# Patient Record
Sex: FEMALE | Race: WHITE | NOT HISPANIC OR LATINO | Employment: FULL TIME | URBAN - METROPOLITAN AREA
[De-identification: names, ages, dates, MRNs, and addresses within clinical notes are randomized per-mention and may not be internally consistent; named-entity substitution may affect disease eponyms.]

---

## 2023-02-27 ENCOUNTER — OFFICE VISIT (OUTPATIENT)
Dept: URGENT CARE | Facility: CLINIC | Age: 33
End: 2023-02-27

## 2023-02-27 ENCOUNTER — APPOINTMENT (OUTPATIENT)
Dept: RADIOLOGY | Facility: CLINIC | Age: 33
End: 2023-02-27

## 2023-02-27 VITALS
OXYGEN SATURATION: 98 % | RESPIRATION RATE: 18 BRPM | BODY MASS INDEX: 44.72 KG/M2 | HEIGHT: 62 IN | WEIGHT: 243 LBS | HEART RATE: 89 BPM | TEMPERATURE: 99.3 F

## 2023-02-27 DIAGNOSIS — S93.402A SPRAIN OF LEFT ANKLE, INITIAL ENCOUNTER: Primary | ICD-10-CM

## 2023-02-27 DIAGNOSIS — S99.919A ANKLE INJURY, INITIAL ENCOUNTER: ICD-10-CM

## 2023-02-27 NOTE — PROGRESS NOTES
[facility]        NAME: Shun Welch is a 33 y o  female  : 1990    MRN: [id number]  DATE: 2023  TIME: 12:20 PM    Assessment and Plan   Sprain of left ankle, initial encounter [S93 402A]  1  Sprain of left ankle, initial encounter  XR ankle 3+ vw left        XR with no fracture per my read  Continue supportive care  Discussed strict return to care precautions as well as red flag symptoms which should prompt immediate ED referral  Pt verbalized understanding and is in agreement with plan  Please follow up with your primary care provider within the next week  Please remember that your visit today was with an urgent care provider and should not replace follow up with your primary care provider for chronic medical issues or annual physicals  ]    Patient Instructions       Follow up with PCP in 3-5 days  Proceed to  ER if symptoms worsen  Chief Complaint     Chief Complaint   Patient presents with   • Ankle Injury     Rolled ankle on Saturday  Has swelling and pain lateral side of lt ankle         History of Present Illness       Patient is a 20-year-old female presenting with left ankle pain x2 days  Suffered inversion injury while wearing heels  Was able to weight-bear immediately and is able to now in office  Has been resting, icing, keeping elevated, and utilizing ibuprofen  Does feel little better but swelling and pain return when walking around  No numbness or tingling  Review of Systems   Review of Systems   Constitutional: Negative for chills and diaphoresis  Respiratory: Negative for shortness of breath  Cardiovascular: Negative for chest pain  Gastrointestinal: Negative for nausea and vomiting  Musculoskeletal: Positive for arthralgias and joint swelling  Negative for back pain, myalgias and neck pain  Neurological: Negative for weakness and numbness  Current Medications     No current outpatient medications on file      Current Allergies Allergies as of 02/27/2023 - never reviewed   Allergen Reaction Noted   • Prednisone Rash 02/27/2023            The following portions of the patient's history were reviewed and updated as appropriate: allergies, current medications, past family history, past medical history, past social history, past surgical history and problem list      No past medical history on file  No past surgical history on file  No family history on file  Medications have been verified  Objective   Pulse 89   Temp 99 3 °F (37 4 °C)   Resp 18   Ht 5' 2" (1 575 m)   Wt 110 kg (243 lb)   LMP 02/27/2023   SpO2 98%   BMI 44 45 kg/m²        Physical Exam     Physical Exam  Vitals and nursing note reviewed  Constitutional:       General: She is not in acute distress  Appearance: Normal appearance  She is not ill-appearing  HENT:      Head: Normocephalic and atraumatic  Cardiovascular:      Rate and Rhythm: Normal rate  Pulmonary:      Effort: Pulmonary effort is normal  No respiratory distress  Musculoskeletal:      Left ankle: Swelling present  No deformity or ecchymosis  Tenderness present over the lateral malleolus  No base of 5th metatarsal or proximal fibula tenderness  Normal range of motion  Left Achilles Tendon: Normal    Skin:     General: Skin is warm and dry  Capillary Refill: Capillary refill takes less than 2 seconds  Neurological:      Mental Status: She is alert and oriented to person, place, and time     Psychiatric:         Behavior: Behavior normal

## 2023-05-07 ENCOUNTER — OFFICE VISIT (OUTPATIENT)
Dept: URGENT CARE | Facility: CLINIC | Age: 33
End: 2023-05-07

## 2023-05-07 VITALS
BODY MASS INDEX: 42.51 KG/M2 | WEIGHT: 231 LBS | TEMPERATURE: 99.1 F | HEIGHT: 62 IN | SYSTOLIC BLOOD PRESSURE: 140 MMHG | OXYGEN SATURATION: 98 % | DIASTOLIC BLOOD PRESSURE: 91 MMHG | HEART RATE: 100 BPM

## 2023-05-07 DIAGNOSIS — A09 TRAVELER'S DIARRHEA: Primary | ICD-10-CM

## 2023-05-07 RX ORDER — LEVONORGESTREL AND ETHINYL ESTRADIOL 0.1-0.02MG
1 KIT ORAL DAILY
COMMUNITY

## 2023-05-07 RX ORDER — AZITHROMYCIN 500 MG/1
1000 TABLET, FILM COATED ORAL ONCE
Qty: 2 TABLET | Refills: 0 | Status: SHIPPED | OUTPATIENT
Start: 2023-05-07 | End: 2023-05-07

## 2023-05-07 NOTE — PROGRESS NOTES
[facility]        NAME: Sunita Lozano is a 33 y o  female  : 1990    MRN: [id number]  DATE: May 7, 2023  TIME: 10:07 AM    Assessment and Plan   Traveler's diarrhea [A09]  1  Traveler's diarrhea  azithromycin (ZITHROMAX) 500 MG tablet            Patient Instructions   Acute Diarrhea: ddx travelers diarrhea  - We discussed that hydration is most important  You can mix 50% gatorade and 50% water  You can also attempt the BRAT diet as discussed  Stay rested  You can stick to a clear liquid diet and advance as tolerated  Begin taking a probiotic  You should replace fluid losses by drinking frequent, small amounts of a glucose and electrolyte containing solution  There are several commercially available products  Avoid consuming dairy products such as milk or cheese until your diarrhea has fully resolved  -Still cultures may be needed if persistent diarrhea  -Azithromycin 1g taken once is treatment as per CDC for travelers diarrhea  Only take this medication if your diarrhea persist past five days and the above measures do not improve stool    -Red flag signs and ED precautions discussed  Follow up with PCP in 3-5 days  Proceed to  ER if symptoms worsen  Chief Complaint     Chief Complaint   Patient presents with   • Diarrhea     Traveled to and from [facility] last week has had diarrhea since         History of Present Illness       The patient is a 21-year-old female who presents today for possible travelers diarrhea x 3 days  The patient was in [facility] last week and upon return three days ago began to experience loose, watery stools  No fever, chills, body aches  No abdominal pain, melena, hematochezia  No nausea, vomiting  No dizziness or weakness  No palpitations  She has good PO intake  No anorexia  No recent antibiotic use  No hx of abdominal surgery  She has been taking Pepto with no relief         Review of Systems   Review of Systems   Constitutional: Negative for activity change, "appetite change, chills, fatigue and fever  Respiratory: Negative for cough, chest tightness, shortness of breath and wheezing  Cardiovascular: Negative for chest pain and palpitations  Gastrointestinal: Positive for diarrhea  Negative for abdominal distention, abdominal pain, anal bleeding, blood in stool, constipation, nausea and vomiting  Genitourinary: Negative for decreased urine volume, dysuria, enuresis, flank pain, frequency, hematuria and urgency  Musculoskeletal: Negative for arthralgias and myalgias  Skin: Negative for rash  Neurological: Negative for dizziness, syncope, weakness and light-headedness  Hematological: Negative for adenopathy  Does not bruise/bleed easily  Current Medications       Current Outpatient Medications:   •  azithromycin (ZITHROMAX) 500 MG tablet, Take 2 tablets (1,000 mg total) by mouth once for 1 dose, Disp: 2 tablet, Rfl: 0  •  levonorgestrel-ethinyl estradiol (AVIANE,ALESSE,LESSINA) 0 1-20 MG-MCG per tablet, Take 1 tablet by mouth daily, Disp: , Rfl:     Current Allergies     Allergies as of 05/07/2023 - Reviewed 05/07/2023   Allergen Reaction Noted   • Prednisone Rash 02/27/2023            The following portions of the patient's history were reviewed and updated as appropriate: allergies, current medications, past family history, past medical history, past social history, past surgical history and problem list      No past medical history on file  No past surgical history on file  No family history on file  Medications have been verified  Objective   /91   Pulse 100   Temp 99 1 °F (37 3 °C)   Ht 5' 2\" (1 575 m)   Wt 105 kg (231 lb)   LMP 04/25/2023   SpO2 98%   BMI 42 25 kg/m²   Patient's last menstrual period was 04/25/2023  Physical Exam     Physical Exam  Vitals and nursing note reviewed  Constitutional:       General: She is not in acute distress  Appearance: Normal appearance  She is well-developed   She " is not ill-appearing, toxic-appearing or diaphoretic  Cardiovascular:      Rate and Rhythm: Normal rate and regular rhythm  Heart sounds: Normal heart sounds  Pulmonary:      Effort: Pulmonary effort is normal       Breath sounds: Normal breath sounds  Abdominal:      General: Abdomen is flat  Bowel sounds are normal  There is no distension  Palpations: Abdomen is soft  Abdomen is not rigid  Tenderness: There is no abdominal tenderness  There is no right CVA tenderness, left CVA tenderness, guarding or rebound  Negative signs include Dorantes's sign and McBurney's sign  Hernia: No hernia is present  Skin:     General: Skin is warm and dry  Capillary Refill: Capillary refill takes less than 2 seconds     Psychiatric:         Behavior: Behavior normal

## 2023-05-07 NOTE — PATIENT INSTRUCTIONS
Traveler's Diarrhea   WHAT YOU NEED TO KNOW:   Traveler's diarrhea occurs during travel or within 10 days after you travel  You can get traveler's diarrhea when you eat or drink contaminated food or water  The food or water may contain bacteria, a virus, or a parasite  Water from a faucet, ice, or drinks that are not sealed can be contaminated  Foods that are prepared with tap water or not cooked properly can also be contaminated  DISCHARGE INSTRUCTIONS:   Return to the emergency department if:   You urinate less than usual or stop urinating  You see blood in your bowel movement  You have severe abdominal pain  You feel like you are going to faint  You are too weak to stand up  You are dizzy or lightheaded  Contact your healthcare provider if:   Your symptoms do not get better with treatment  Your diarrhea lasts for more than 7 days  You have questions or concerns about your condition or care  Medicines:   Medicines  can help decrease diarrhea or nausea, or treat an infection caused by bacteria or a parasite  Take your medicine as directed  Contact your healthcare provider if you think your medicine is not helping or if you have side effects  Tell your provider if you are allergic to any medicine  Keep a list of the medicines, vitamins, and herbs you take  Include the amounts, and when and why you take them  Bring the list or the pill bottles to follow-up visits  Carry your medicine list with you in case of an emergency  Manage your symptoms:   Drink liquids as directed  Liquids will help prevent dehydration caused by diarrhea  Ask your healthcare provider how much liquid to drink each day and which liquids are best for you  You may need to drink an oral rehydration solution (ORS)  An ORS has the right amounts of water, salts, and sugar you need to replace body fluids  You can buy an ORS at most grocery stores and pharmacies       Eat foods that are easy to digest   Examples include rice, lentils, cereal, bananas, potatoes, and bread  It also includes some fruits (bananas, melon), well-cooked vegetables, and lean meats  Do not drink alcohol until your diarrhea is gone  Prevent traveler's diarrhea:   Ask if you should take certain medicines or get vaccines before you travel  Your healthcare provider may prescribe antibiotics to prevent traveler's diarrhea  Vaccines can help protect you against bacteria or viruses that cause traveler's diarrhea  Drink only bottled, canned, or boiled liquids when you travel  Do not put ice in your drinks  Boil water for at least 4 minutes, or use purifying tablets to treat the water  Use bottled or treated water to brush your teeth  Do not eat raw food or dairy when you travel  Examples include fruits, raw vegetables in salads, oysters, clams, or undercooked meat  Do not have milk, ice cream, or other dairy products  Eat foods that are served hot or steaming, breads, peeled fruits and vegetables, and grilled foods  Wash your hands often  Use bottled water and soap  Wash your hands before you eat or prepare food  Also wash your hands after you use the bathroom  Follow up with your healthcare provider as directed:  Write down your questions so you remember to ask them during your visits  © Copyright [employer] 2022 Information is for End User's use only and may not be sold, redistributed or otherwise used for commercial purposes  The above information is an  only  It is not intended as medical advice for individual conditions or treatments  Talk to your doctor, nurse or pharmacist before following any medical regimen to see if it is safe and effective for you  Acute Diarrhea: ddx travelers diarrhea  - We discussed that hydration is most important  You can mix 50% gatorade and 50% water  You can also attempt the BRAT diet as discussed  Stay rested  You can stick to a clear liquid diet and advance as tolerated   Begin taking a probiotic  You should replace fluid losses by drinking frequent, small amounts of a glucose and electrolyte containing solution  There are several commercially available products  Avoid consuming dairy products such as milk or cheese until your diarrhea has fully resolved  -Still cultures may be needed if persistent diarrhea  -Azithromycin 1g taken once is treatment as per CDC for travelers diarrhea  Only take this medication if your diarrhea persist past five days and the above measures do not improve stool    -Red flag signs and ED precautions discussed